# Patient Record
Sex: FEMALE | Race: ASIAN | NOT HISPANIC OR LATINO | URBAN - METROPOLITAN AREA
[De-identification: names, ages, dates, MRNs, and addresses within clinical notes are randomized per-mention and may not be internally consistent; named-entity substitution may affect disease eponyms.]

---

## 2024-10-15 ENCOUNTER — LAB VISIT (OUTPATIENT)
Dept: LAB | Facility: HOSPITAL | Age: 34
End: 2024-10-15
Attending: INTERNAL MEDICINE
Payer: COMMERCIAL

## 2024-10-15 ENCOUNTER — OFFICE VISIT (OUTPATIENT)
Dept: FAMILY MEDICINE | Facility: CLINIC | Age: 34
End: 2024-10-15
Payer: COMMERCIAL

## 2024-10-15 VITALS
OXYGEN SATURATION: 98 % | BODY MASS INDEX: 39.63 KG/M2 | TEMPERATURE: 98 F | DIASTOLIC BLOOD PRESSURE: 62 MMHG | WEIGHT: 232.13 LBS | HEIGHT: 64 IN | SYSTOLIC BLOOD PRESSURE: 116 MMHG | HEART RATE: 86 BPM

## 2024-10-15 DIAGNOSIS — Z00.00 HEALTHCARE MAINTENANCE: ICD-10-CM

## 2024-10-15 DIAGNOSIS — E89.0 POSTSURGICAL HYPOTHYROIDISM: ICD-10-CM

## 2024-10-15 DIAGNOSIS — Z00.00 HEALTHCARE MAINTENANCE: Primary | ICD-10-CM

## 2024-10-15 LAB
ALBUMIN SERPL BCP-MCNC: 4.1 G/DL (ref 3.5–5.2)
ALP SERPL-CCNC: 54 U/L (ref 55–135)
ALT SERPL W/O P-5'-P-CCNC: 11 U/L (ref 10–44)
ANION GAP SERPL CALC-SCNC: 9 MMOL/L (ref 8–16)
AST SERPL-CCNC: 14 U/L (ref 10–40)
BASOPHILS # BLD AUTO: 0.11 K/UL (ref 0–0.2)
BASOPHILS NFR BLD: 1.3 % (ref 0–1.9)
BILIRUB SERPL-MCNC: 0.6 MG/DL (ref 0.1–1)
BUN SERPL-MCNC: 8 MG/DL (ref 6–20)
CALCIUM SERPL-MCNC: 9.3 MG/DL (ref 8.7–10.5)
CHLORIDE SERPL-SCNC: 105 MMOL/L (ref 95–110)
CHOLEST SERPL-MCNC: 188 MG/DL (ref 120–199)
CHOLEST/HDLC SERPL: 3.4 {RATIO} (ref 2–5)
CO2 SERPL-SCNC: 27 MMOL/L (ref 23–29)
CREAT SERPL-MCNC: 0.8 MG/DL (ref 0.5–1.4)
DIFFERENTIAL METHOD BLD: ABNORMAL
EOSINOPHIL # BLD AUTO: 0.2 K/UL (ref 0–0.5)
EOSINOPHIL NFR BLD: 1.7 % (ref 0–8)
ERYTHROCYTE [DISTWIDTH] IN BLOOD BY AUTOMATED COUNT: 12.3 % (ref 11.5–14.5)
EST. GFR  (NO RACE VARIABLE): >60 ML/MIN/1.73 M^2
GLUCOSE SERPL-MCNC: 86 MG/DL (ref 70–110)
HCT VFR BLD AUTO: 44.1 % (ref 37–48.5)
HDLC SERPL-MCNC: 56 MG/DL (ref 40–75)
HDLC SERPL: 29.8 % (ref 20–50)
HGB BLD-MCNC: 13.9 G/DL (ref 12–16)
IMM GRANULOCYTES # BLD AUTO: 0.03 K/UL (ref 0–0.04)
IMM GRANULOCYTES NFR BLD AUTO: 0.3 % (ref 0–0.5)
LDLC SERPL CALC-MCNC: 113.2 MG/DL (ref 63–159)
LYMPHOCYTES # BLD AUTO: 1.7 K/UL (ref 1–4.8)
LYMPHOCYTES NFR BLD: 20.2 % (ref 18–48)
MCH RBC QN AUTO: 28.4 PG (ref 27–31)
MCHC RBC AUTO-ENTMCNC: 31.5 G/DL (ref 32–36)
MCV RBC AUTO: 90 FL (ref 82–98)
MONOCYTES # BLD AUTO: 0.6 K/UL (ref 0.3–1)
MONOCYTES NFR BLD: 7 % (ref 4–15)
NEUTROPHILS # BLD AUTO: 6 K/UL (ref 1.8–7.7)
NEUTROPHILS NFR BLD: 69.5 % (ref 38–73)
NONHDLC SERPL-MCNC: 132 MG/DL
NRBC BLD-RTO: 0 /100 WBC
PLATELET # BLD AUTO: 347 K/UL (ref 150–450)
PMV BLD AUTO: 9.6 FL (ref 9.2–12.9)
POTASSIUM SERPL-SCNC: 4.1 MMOL/L (ref 3.5–5.1)
PROT SERPL-MCNC: 7.4 G/DL (ref 6–8.4)
RBC # BLD AUTO: 4.89 M/UL (ref 4–5.4)
SODIUM SERPL-SCNC: 141 MMOL/L (ref 136–145)
T4 FREE SERPL-MCNC: 1 NG/DL (ref 0.71–1.51)
TRIGL SERPL-MCNC: 94 MG/DL (ref 30–150)
TSH SERPL DL<=0.005 MIU/L-ACNC: 4.47 UIU/ML (ref 0.4–4)
WBC # BLD AUTO: 8.6 K/UL (ref 3.9–12.7)

## 2024-10-15 PROCEDURE — 3008F BODY MASS INDEX DOCD: CPT | Mod: CPTII,S$GLB,, | Performed by: INTERNAL MEDICINE

## 2024-10-15 PROCEDURE — 3074F SYST BP LT 130 MM HG: CPT | Mod: CPTII,S$GLB,, | Performed by: INTERNAL MEDICINE

## 2024-10-15 PROCEDURE — 3078F DIAST BP <80 MM HG: CPT | Mod: CPTII,S$GLB,, | Performed by: INTERNAL MEDICINE

## 2024-10-15 PROCEDURE — 85025 COMPLETE CBC W/AUTO DIFF WBC: CPT | Performed by: INTERNAL MEDICINE

## 2024-10-15 PROCEDURE — 84439 ASSAY OF FREE THYROXINE: CPT | Performed by: INTERNAL MEDICINE

## 2024-10-15 PROCEDURE — 36415 COLL VENOUS BLD VENIPUNCTURE: CPT | Mod: PN | Performed by: INTERNAL MEDICINE

## 2024-10-15 PROCEDURE — 1159F MED LIST DOCD IN RCRD: CPT | Mod: CPTII,S$GLB,, | Performed by: INTERNAL MEDICINE

## 2024-10-15 PROCEDURE — 80061 LIPID PANEL: CPT | Performed by: INTERNAL MEDICINE

## 2024-10-15 PROCEDURE — 99204 OFFICE O/P NEW MOD 45 MIN: CPT | Mod: S$GLB,,, | Performed by: INTERNAL MEDICINE

## 2024-10-15 PROCEDURE — 1160F RVW MEDS BY RX/DR IN RCRD: CPT | Mod: CPTII,S$GLB,, | Performed by: INTERNAL MEDICINE

## 2024-10-15 PROCEDURE — 86803 HEPATITIS C AB TEST: CPT | Performed by: INTERNAL MEDICINE

## 2024-10-15 PROCEDURE — 99999 PR PBB SHADOW E&M-EST. PATIENT-LVL IV: CPT | Mod: PBBFAC,,, | Performed by: INTERNAL MEDICINE

## 2024-10-15 PROCEDURE — 84443 ASSAY THYROID STIM HORMONE: CPT | Performed by: INTERNAL MEDICINE

## 2024-10-15 PROCEDURE — 83036 HEMOGLOBIN GLYCOSYLATED A1C: CPT | Performed by: INTERNAL MEDICINE

## 2024-10-15 PROCEDURE — 87389 HIV-1 AG W/HIV-1&-2 AB AG IA: CPT | Performed by: INTERNAL MEDICINE

## 2024-10-15 PROCEDURE — 80053 COMPREHEN METABOLIC PANEL: CPT | Performed by: INTERNAL MEDICINE

## 2024-10-15 RX ORDER — LEVOTHYROXINE SODIUM 100 UG/1
100 TABLET ORAL
Qty: 30 TABLET | Refills: 11 | Status: SHIPPED | OUTPATIENT
Start: 2024-10-15 | End: 2025-10-15

## 2024-10-15 RX ORDER — LEVOTHYROXINE SODIUM 100 UG/1
10 CAPSULE ORAL DAILY
COMMUNITY
Start: 2024-04-08 | End: 2024-10-15

## 2024-10-15 NOTE — PROGRESS NOTES
HISTORY OF PRESENT ILLNESS:  Dennise Walters is a 33 y.o. female who presents to the clinic today for Establish Care and Medication Refill  .     History of Present Illness    Dennise presents today for medication refill and to establish care.    She underwent a total thyroidectomy with neck dissection in 2024 for thyroid nodules. Histopathology revealed thyroid follicular nodular disease, multinodular colloid goiter with chronic lymphocytic thyroiditis affecting the right lobe, left lobe, and isthmus. Prior to surgery, she experienced high heart rate, palpitations, and shortness of breath when talking and climbing stairs quickly. She denies any current symptoms of chest pain, palpitations, or lightheadedness since the thyroidectomy and initiation of thyroid hormone replacement therapy. Her TSH level from May 13, 2024, was 2.57, within normal range. She is currently taking Levothyroxine (Euthyrox) 100 mcg daily for hypothyroidism management.    She underwent ambulatory and Holter monitoring prior to her thyroid diagnosis due to experiencing a high heart rate. The interpreting cardiologist inquired about any health concerns, to which she responded negatively. No further cardiac evaluation was pursued at that time.    She reports a history of Polycystic Ovary Syndrome (PCOS), diagnosed in . She denies any follow-up care for this condition since the initial diagnosis.    She reports maternal history of diabetes and hypertension. Maternal grandmother had diabetes. Maternal grandfather had alcohol dependence and liver cancer. Paternal grandmother has hypertension and was recently diagnosed with a bone disease following a fall and surgery, with a biopsy suggesting possible malignancy. Paternal grandfather  of lung cancer; she denies he was a smoker but reports possible fertilizer exposure in his youth. Maternal uncle had alcohol dependence. Maternal aunt had liver cancer. She denies  family history of heart attack or stroke.    She reports allergies to crab and shrimp. She denies any medication allergies.    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 She lives alone. She reports occasional alcohol use, typically one drink per month. She denies any smoking history.    Total thyroidectomy on March 25, 2024, performed by Dr. Pro Burrell in [facility].    She reports receiving 5 COVID-19 shots, with the most recent one administered in 2023. She completed the HPV vaccine series in 2024. She received a pneumococcal vaccine in 2022. She recalls last receiving a tetanus booster during college and is willing to receive an update.      ROS:  General: -fever, -chills, -fatigue, -weight gain, -weight loss  Eyes: -vision changes, -redness, -discharge  ENT: -ear pain, -nasal congestion, -sore throat  Cardiovascular: -chest pain, +palpitations, -lower extremity edema  Respiratory: -cough, +shortness of breath  Gastrointestinal: -abdominal pain, -nausea, -vomiting, -diarrhea, -constipation, -blood in stool  Genitourinary: -dysuria, -hematuria, -frequency  Musculoskeletal: -joint pain, -muscle pain  Skin: -rash, -lesion  Neurological: -headache, -dizziness, -numbness, -tingling  Psychiatric: -anxiety, -depression, -sleep difficulty  Allergic: +allergic reactions             PAST MEDICAL HISTORY:  Past Medical History:   Diagnosis Date    Hypothyroidism     PCOS (polycystic ovarian syndrome)        PAST SURGICAL HISTORY:  Past Surgical History:   Procedure Laterality Date    THYROIDECTOMY, TOTAL, WITH RADICAL NECK DISSECTION Bilateral        SOCIAL HISTORY:  Social History     Socioeconomic History    Marital status: Single   Occupational History    Occupation: nurse [employer]   Tobacco Use    Smoking status: Never    Smokeless tobacco: Never   Substance and Sexual Activity    Alcohol use: Not Currently     Comment: one beer per month    Drug use: Never    Sexual activity: Never      Social Drivers of Health     Financial Resource Strain: Low Risk  (9/9/2024)    Overall Financial Resource Strain (CARDIA)     Difficulty of Paying Living Expenses: Not hard at all   Food Insecurity: No Food Insecurity (9/9/2024)    Hunger Vital Sign     Worried About Running Out of Food in the Last Year: Never true     Ran Out of Food in the Last Year: Never true   Physical Activity: Insufficiently Active (9/9/2024)    Exercise Vital Sign     Days of Exercise per Week: 1 day     Minutes of Exercise per Session: 50 min   Stress: No Stress Concern Present (9/9/2024)    [facility] [place] [place] [employer] - Occupational Stress Questionnaire     Feeling of Stress : Not at all   Housing Stability: Unknown (9/9/2024)    Housing Stability Vital Sign     Unable to Pay for Housing in the Last Year: No       FAMILY HISTORY:  Family History   Problem Relation Name Age of Onset    Diabetes Mother Magda Walters     Hypertension Mother Magda Walters     No Known Problems Father      No Known Problems Sister      Diabetes Maternal Grandmother Funmilayo Bowling     Alcohol abuse Maternal Grandfather Ab Bowling     Liver disease Maternal Grandfather Ab Bowling     Hypertension Paternal Grandmother Eileen Estrellado     Cancer Paternal Grandmother Eileen Estrellado     Lung cancer Paternal Grandfather Martin Estrdomenic     Alcohol abuse Maternal Uncle Ab Bowling Jr        ALLERGIES AND MEDICATIONS: updated and reviewed.  Review of patient's allergies indicates:   Allergen Reactions    Crab Hives    Shrimp Hives     Medication List with Changes/Refills   New Medications    LEVOTHYROXINE (SYNTHROID) 100 MCG TABLET    Take 1 tablet (100 mcg total) by mouth before breakfast.   Discontinued Medications    LEVOTHYROXINE (TIROSINT) 100 MCG CAP    Take 10 mcg by mouth once daily.          CARE TEAM:  Patient Care Team:  Carlos Dallas MD as PCP - General (Internal Medicine)         PHYSICAL EXAM:  "  Vitals:    10/15/24 0908   BP: 116/62   Pulse: 86   Temp: 98.4 °F (36.9 °C)     Weight: 105.3 kg (232 lb 2.3 oz)   Height: 5' 4" (162.6 cm)   Body mass index is 39.85 kg/m².    Physical Exam    Vitals: Blood pressure: 116/62. Pulse: 86.  General: No acute distress. Well-developed. Well-nourished.  Eyes: EOMI. Sclerae anicteric.  HENT: Normocephalic. Atraumatic. Nares patent. Moist oral mucosa.  Ears: Bilateral TMs clear. Bilateral EACs clear.  Cardiovascular: Regular rate. Regular rhythm. No murmurs. No rubs. No gallops. Normal S1, S2.  Respiratory: Normal respiratory effort. Clear to auscultation bilaterally. No rales. No rhonchi. No wheezing.  Abdomen: Soft. Non-tender. Non-distended. Normoactive bowel sounds.  Musculoskeletal: No  obvious deformity.  Extremities: No lower extremity edema.  Neurological: Alert & oriented x3. No slurred speech. Normal gait.  Psychiatric: Normal mood. Normal affect. Good insight. Good judgment.  Skin: Warm. Dry. No rash.  Neck: Post-surgical scar on the neck.             ASSESSMENT AND PLAN:  Assessment & Plan    Reviewed patient's history of total thyroidectomy and neck dissection performed in March 2024 due to thyroid nodules  Assessed histopathology report indicating thyroid follicular nodular disease and multinodular colloid goiter with chronic lymphocytic thyroiditis  Evaluated most recent TSH level from May 13, 2024, which was within normal range at 2.57 on current levothyroxine dose  Considered patient's history of elevated heart rate prior to thyroid surgery, now resolved  Will obtain baseline EKG due to previous cardiac symptoms, despite current normal vital signs  Determined need for cervical cancer screening update    Postsurgical hypothyroidism  -     levothyroxine (SYNTHROID) 100 MCG tablet; Take 1 tablet (100 mcg total) by mouth before breakfast.  Dispense: 30 tablet; Refill: 11  -     EKG 12-lead  - Continued Euthyrox 100 mcg daily.    Healthcare maintenance  -     " Hemoglobin A1C; Future; Expected date: 10/15/2024  -     Comprehensive Metabolic Panel; Future; Expected date: 10/15/2024  -     Lipid Panel; Future; Expected date: 10/15/2024  -     CBC Auto Differential; Future; Expected date: 10/15/2024  -     TSH; Future; Expected date: 10/15/2024  -     HIV 1/2 Ag/Ab (4th Gen); Future; Expected date: 10/15/2024  -     Hepatitis C Antibody; Future; Expected date: 10/15/2024  -     Ambulatory referral/consult to Obstetrics / Gynecology; Future; Expected date: 10/22/2024  -     EKG 12-lead  - Explained availability of updated COVID-19 booster for 1879-6308 season.  - Administered COVID-19 booster vaccine for 7523-1476 season.  - Discussed importance of cervical cancer screening despite HPV vaccination.  - Referred to gynecologist for cervical cancer screening and PCOS follow-up.  - Ordered fasting labs.  - Ordered EKG for baseline documentation.  - Follow up in 6 months.  - If no issues arise, transition to annual visits thereafter.         This note was generated with the assistance of ambient listening technology. Verbal consent was obtained by the patient and accompanying visitor(s) for the recording of patient appointment to facilitate this note. I attest to having reviewed and edited the generated note for accuracy, though some syntax or spelling errors may persist. Please contact the author of this note for any clarification.        This is my first encounter with patient.    Surgeon in [facility]: Dr. Pro Burrell.  Thyroidectomy 3/25/2024.    Answers submitted by the patient for this visit:  Review of Systems Questionnaire (Submitted on 10/10/2024)  activity change: No  unexpected weight change: No  neck pain: No  hearing loss: No  rhinorrhea: No  trouble swallowing: No  eye discharge: No  visual disturbance: No  chest tightness: No  wheezing: No  chest pain: No  palpitations: No  blood in stool: No  constipation: No  vomiting: No  diarrhea: No  polydipsia:  No  polyuria: No  difficulty urinating: No  hematuria: No  menstrual problem: Yes  dysuria: No  joint swelling: No  arthralgias: No  headaches: No  weakness: No  confusion: No  dysphoric mood: No

## 2024-10-15 NOTE — PATIENT INSTRUCTIONS
Consider getting an updated COVID vaccine.    It is recommended for everyone ages 6 months and older to receive an updated 3936-0719 COVID-19 vaccine to protect against the potentially serious outcomes of COVID-19 this fall and winter whether or not they have ever previously been vaccinated with a COVID-19 vaccine.  The virus that causes COVID-19 is always changing and protection from COVID-19 vaccines declines over time. Receiving an updated 3744-7937 COVID-19 vaccine can restore and enhance protection against the virus variants currently responsible for most infections and hospitalizations in the [place]. COVID-19 vaccination also reduces the chance of suffering the effects of Long COVID, which can develop during or following acute infection and last for an extended duration.

## 2024-10-16 LAB
ESTIMATED AVG GLUCOSE: 100 MG/DL (ref 68–131)
HBA1C MFR BLD: 5.1 % (ref 4–5.6)
HCV AB SERPL QL IA: NORMAL
HIV 1+2 AB+HIV1 P24 AG SERPL QL IA: NORMAL

## 2024-10-21 ENCOUNTER — OFFICE VISIT (OUTPATIENT)
Dept: OBSTETRICS AND GYNECOLOGY | Facility: CLINIC | Age: 34
End: 2024-10-21
Payer: COMMERCIAL

## 2024-10-21 VITALS
BODY MASS INDEX: 39.6 KG/M2 | DIASTOLIC BLOOD PRESSURE: 70 MMHG | WEIGHT: 231.94 LBS | HEIGHT: 64 IN | SYSTOLIC BLOOD PRESSURE: 116 MMHG

## 2024-10-21 DIAGNOSIS — Z00.00 HEALTHCARE MAINTENANCE: ICD-10-CM

## 2024-10-21 DIAGNOSIS — N93.9 ABNORMAL UTERINE BLEEDING (AUB): ICD-10-CM

## 2024-10-21 DIAGNOSIS — Z01.419 WELL WOMAN EXAM WITH ROUTINE GYNECOLOGICAL EXAM: Primary | ICD-10-CM

## 2024-10-21 PROCEDURE — 87624 HPV HI-RISK TYP POOLED RSLT: CPT | Performed by: STUDENT IN AN ORGANIZED HEALTH CARE EDUCATION/TRAINING PROGRAM

## 2024-10-21 PROCEDURE — 3008F BODY MASS INDEX DOCD: CPT | Mod: CPTII,S$GLB,, | Performed by: STUDENT IN AN ORGANIZED HEALTH CARE EDUCATION/TRAINING PROGRAM

## 2024-10-21 PROCEDURE — 99385 PREV VISIT NEW AGE 18-39: CPT | Mod: S$GLB,,, | Performed by: STUDENT IN AN ORGANIZED HEALTH CARE EDUCATION/TRAINING PROGRAM

## 2024-10-21 PROCEDURE — 1159F MED LIST DOCD IN RCRD: CPT | Mod: CPTII,S$GLB,, | Performed by: STUDENT IN AN ORGANIZED HEALTH CARE EDUCATION/TRAINING PROGRAM

## 2024-10-21 PROCEDURE — 1160F RVW MEDS BY RX/DR IN RCRD: CPT | Mod: CPTII,S$GLB,, | Performed by: STUDENT IN AN ORGANIZED HEALTH CARE EDUCATION/TRAINING PROGRAM

## 2024-10-21 PROCEDURE — 3074F SYST BP LT 130 MM HG: CPT | Mod: CPTII,S$GLB,, | Performed by: STUDENT IN AN ORGANIZED HEALTH CARE EDUCATION/TRAINING PROGRAM

## 2024-10-21 PROCEDURE — 3078F DIAST BP <80 MM HG: CPT | Mod: CPTII,S$GLB,, | Performed by: STUDENT IN AN ORGANIZED HEALTH CARE EDUCATION/TRAINING PROGRAM

## 2024-10-21 PROCEDURE — 3044F HG A1C LEVEL LT 7.0%: CPT | Mod: CPTII,S$GLB,, | Performed by: STUDENT IN AN ORGANIZED HEALTH CARE EDUCATION/TRAINING PROGRAM

## 2024-10-21 PROCEDURE — 99999 PR PBB SHADOW E&M-EST. PATIENT-LVL III: CPT | Mod: PBBFAC,,, | Performed by: STUDENT IN AN ORGANIZED HEALTH CARE EDUCATION/TRAINING PROGRAM

## 2024-10-21 PROCEDURE — 88175 CYTOPATH C/V AUTO FLUID REDO: CPT | Performed by: STUDENT IN AN ORGANIZED HEALTH CARE EDUCATION/TRAINING PROGRAM

## 2024-10-21 NOTE — PROGRESS NOTES
Subjective     Patient ID: Dennise Walters is a 33 y.o. female.    Chief Complaint:  Well Woman (Irregular Period./)      History of Present Illness  32 yo G0 presents for WWE and discuss AUB    History of PCOS, was on metformin/hormonal medication previously  Periods occur roughly monthly and last 7 days, bleeding is light  In the past has missed 2-3 months of cycles in a row but not within the last year     Just moved from [facility] a few months ago. Has never been sexually active or had a pap screening     GYN & OB History  No LMP recorded (lmp unknown).   Date of Last Pap: No result found    OB History    Para Term  AB Living   0 0 0 0 0 0   SAB IAB Ectopic Multiple Live Births   0 0 0 0 0       Review of Systems  Review of Systems   All other systems reviewed and are negative.         Objective   Physical Exam:   Constitutional: She appears well-developed and well-nourished.    HENT:   Head: Normocephalic and atraumatic.    Eyes: EOM are normal.      Pulmonary/Chest: Effort normal.        Abdominal: Soft.     Genitourinary:    Vagina, right adnexa and left adnexa normal.      Pelvic exam was performed with patient in the lithotomy position.   The external female genitalia was normal.   Genitalia hair distrobution normal .   There is no lesion on the right labia. There is no lesion on the left labia.    pap smear completed   Genitourinary Comments: Female chaperone present for duration of exam    Cervix not fully visualized due to discomfort with speculum exam             Musculoskeletal: Normal range of motion.       Neurological: She is alert.    Skin: Skin is warm and dry.    Psychiatric: She has a normal mood and affect.            Assessment and Plan     1. Well woman exam with routine gynecological exam    2. Healthcare maintenance    3. Abnormal uterine bleeding (AUB)        Plan:  1. Well woman exam with routine gynecological exam (Primary)  - Pap done today.  -  Screening tests as ordered.  - Diet and exercise encouraged.  Reviewed ASCCP Pap guidelines and screening recommendations.    Counseling: Exercise  Health Screens: Mammography  Colonoscopy:discussed  Health Maintenance reviewed  - Liquid-Based Pap Smear, Screening  - Pt cycles wnl, advised to return if she misses periods 2-3 months in a row    2. Healthcare maintenance  - Ambulatory referral/consult to Obstetrics / Gynecology    Follow up in 1 year for WWE or sooner as needed     Abraham Lindsay III, MD  [facility]   [address] [place] [address] [phone]

## 2024-10-23 ENCOUNTER — TELEPHONE (OUTPATIENT)
Dept: FAMILY MEDICINE | Facility: CLINIC | Age: 34
End: 2024-10-23
Payer: COMMERCIAL

## 2024-10-23 DIAGNOSIS — E89.0 POSTSURGICAL HYPOTHYROIDISM: Primary | ICD-10-CM

## 2024-10-23 LAB
HPV HR 12 DNA SPEC QL NAA+PROBE: NEGATIVE
HPV16 AG SPEC QL: NEGATIVE
HPV18 DNA SPEC QL NAA+PROBE: NEGATIVE

## 2024-10-23 NOTE — TELEPHONE ENCOUNTER
----- Message from Carlos Dallas MD sent at 10/23/2024  2:12 PM CDT -----  Call to schedule non fasting lab Jan 2025.

## 2024-10-25 LAB
FINAL PATHOLOGIC DIAGNOSIS: NORMAL
Lab: NORMAL

## 2025-01-06 ENCOUNTER — LAB VISIT (OUTPATIENT)
Dept: LAB | Facility: HOSPITAL | Age: 35
End: 2025-01-06
Attending: INTERNAL MEDICINE
Payer: COMMERCIAL

## 2025-01-06 DIAGNOSIS — E89.0 POSTSURGICAL HYPOTHYROIDISM: ICD-10-CM

## 2025-01-06 LAB
T4 FREE SERPL-MCNC: 1.08 NG/DL (ref 0.71–1.51)
TSH SERPL DL<=0.005 MIU/L-ACNC: 11.01 UIU/ML (ref 0.4–4)

## 2025-01-06 PROCEDURE — 36415 COLL VENOUS BLD VENIPUNCTURE: CPT | Performed by: INTERNAL MEDICINE

## 2025-01-06 PROCEDURE — 84439 ASSAY OF FREE THYROXINE: CPT | Performed by: INTERNAL MEDICINE

## 2025-01-06 PROCEDURE — 84443 ASSAY THYROID STIM HORMONE: CPT | Performed by: INTERNAL MEDICINE

## 2025-01-08 ENCOUNTER — TELEPHONE (OUTPATIENT)
Dept: FAMILY MEDICINE | Facility: CLINIC | Age: 35
End: 2025-01-08
Payer: COMMERCIAL

## 2025-01-08 DIAGNOSIS — E89.0 POSTSURGICAL HYPOTHYROIDISM: Primary | ICD-10-CM

## 2025-01-08 RX ORDER — LEVOTHYROXINE SODIUM 112 UG/1
112 TABLET ORAL
Qty: 30 TABLET | Refills: 11 | Status: SHIPPED | OUTPATIENT
Start: 2025-01-08 | End: 2026-01-08

## 2025-01-08 NOTE — TELEPHONE ENCOUNTER
----- Message from Carlos Dallas MD sent at 1/8/2025 12:53 PM CST -----  Please call to schedule non fasting thyroid lab in 8 weeks.

## 2025-01-24 ENCOUNTER — OFFICE VISIT (OUTPATIENT)
Dept: ENDOCRINOLOGY | Facility: CLINIC | Age: 35
End: 2025-01-24
Payer: COMMERCIAL

## 2025-01-24 VITALS
WEIGHT: 229.38 LBS | HEART RATE: 112 BPM | DIASTOLIC BLOOD PRESSURE: 86 MMHG | SYSTOLIC BLOOD PRESSURE: 106 MMHG | BODY MASS INDEX: 39.38 KG/M2

## 2025-01-24 DIAGNOSIS — E66.812 CLASS 2 OBESITY WITHOUT SERIOUS COMORBIDITY WITH BODY MASS INDEX (BMI) OF 39.0 TO 39.9 IN ADULT, UNSPECIFIED OBESITY TYPE: ICD-10-CM

## 2025-01-24 DIAGNOSIS — E89.0 POSTSURGICAL HYPOTHYROIDISM: Primary | ICD-10-CM

## 2025-01-24 PROCEDURE — 99999 PR PBB SHADOW E&M-EST. PATIENT-LVL III: CPT | Mod: PBBFAC,,, | Performed by: HOSPITALIST

## 2025-01-24 PROCEDURE — 1160F RVW MEDS BY RX/DR IN RCRD: CPT | Mod: CPTII,S$GLB,, | Performed by: HOSPITALIST

## 2025-01-24 PROCEDURE — 3079F DIAST BP 80-89 MM HG: CPT | Mod: CPTII,S$GLB,, | Performed by: HOSPITALIST

## 2025-01-24 PROCEDURE — 3008F BODY MASS INDEX DOCD: CPT | Mod: CPTII,S$GLB,, | Performed by: HOSPITALIST

## 2025-01-24 PROCEDURE — 3074F SYST BP LT 130 MM HG: CPT | Mod: CPTII,S$GLB,, | Performed by: HOSPITALIST

## 2025-01-24 PROCEDURE — 99204 OFFICE O/P NEW MOD 45 MIN: CPT | Mod: S$GLB,,, | Performed by: HOSPITALIST

## 2025-01-24 PROCEDURE — 1159F MED LIST DOCD IN RCRD: CPT | Mod: CPTII,S$GLB,, | Performed by: HOSPITALIST

## 2025-01-24 NOTE — ASSESSMENT & PLAN NOTE
- Body mass index is 39.38 kg/m².  - Encourage pt to work on healthy diet, increase exercise as tolerated.  - Continue to monitor weight   - Consider [facility] loss [facility]

## 2025-01-24 NOTE — PROGRESS NOTES
Subjective:      Patient ID: Dennise Walters is a 34 y.o. female presented to [facility] today.  Chief complaint:  Abnormal Lab (tsh)      History of Present illness: Dennise Walters is a 34 y.o. female here for  postsurgical hypothyroidism  Other significant past medical history:  Obesity, PCOS  Current PCP Carlos Dallas MD  Work in the [employer]      Interval history: New to Endocrine.  Patient is a nurse here at our emergency room.  Originally from the [facility].    Patient had a total thyroidectomy due to hyperthyroidism and multiple thyroid nodules in the [facility] on 3/25/2024.  [place] the [place] after that.  Having issue adjusting her levothyroxine dose.    Patient does work shift work, with occasional night shift and day shift  She gain weight after her thyroid surgery, now weight 229 lb       Postsurgical Hypothyroidism  - Diagnosed  in: Total thyroidectomy 3/25/2024 in setting of hyperthyroidism and goiter/multiple thyroid nodules in the [facility].  - Medical record review, will scan to our records.  had hyperthyroidism elevated FT4 and FT3 with low TSH in 2/5/2024, symptoms of tachycardia: was on Carbimazole and propanolol. Had Lugol and SSKI drops before surgery given elevated FT4, FT3 at before total thyroidectomy. She was in [place] go [place] the [place].   - Pathology records review:  Pathology:  Multiple colloid goiter, with chronic lymphocytic thyroiditis  - Family history thyroid disorder: no, not that she know off   - Hx of Thyroid goiter: yes  - Hx of Autoimmune issue: no, ?mom with arthritis?  - Hx of gluten intolerance/lactose intolerance: yes? Lactose intolerance?  - Tobacco use: no  - Plan for pregnancy at this time: no  - Chronic tachycardia noted  - Irregular menstrual cycle> for many yeas, PCOS>> was given metformin     - Current medication: Generic levothyroxine 112 mcg daily, was on Euthyrox>> swap  "to generic  - Takes thyroid medication properly without food first thing in the morning, no    Current symptoms:   No   Yes  []    [x]   Weight gain  []    [x]   Fatigue  [x]    []   Constipation  []    [x]   Hair loss  [x]    []   Brittle nails  [x]    []   Mental fog  []    [x]   Cold intolerance  [x]    []   Recent severe illness  Medications:  [x]    []   Lithium Use  [x]    []   Amiodarone use  [x]    []   NSAIDs/systemic steroid  []    []   Chemotherapy   []    []   Radiation Treatment  []    []   Estrogen therapy  []    []   Supplements:  That includes:  High dose iodine: Including "thyroid support", Kelp, Iodine drops, Sea iqbal, Bladder wrack  [x]    []   Other Supplements: Biotin, Ashwagandha, selenium    Thyroid lab work  Lab Results   Component Value Date    TSH 11.009 (H) 01/06/2025    TSH 4.475 (H) 10/15/2024    FREET4 1.08 01/06/2025    FREET4 1.00 10/15/2024      Antibodies  No results found for: "THYROPEROXID", "THYROIDAB", "TSIMMGLBN", "THYROIDSTIMI", "THYROTROPINR", "THGABSCRN"       The patient's medications, allergies, past medical, surgical, social and family histories were reviewed and updated as appropriate.  Review of Systems: pertinent positives as per the above HPI, and otherwise negative.    Objective:   /86   Pulse (!) 112   Wt 104.1 kg (229 lb 6.4 oz)   BMI 39.38 kg/m²   Body mass index is 39.38 kg/m².  Vital signs reviewed    Physical Exam  Vitals and nursing note reviewed.   Constitutional:       Appearance: Normal appearance. She is well-developed. She is obese. She is not ill-appearing.   Neck:      Thyroid: No thyromegaly.   Pulmonary:      Effort: Pulmonary effort is normal. No respiratory distress.   Musculoskeletal:         General: Normal range of motion.      Cervical back: Normal range of motion.   Neurological:      General: No focal deficit present.      Mental Status: She is alert. Mental status is at baseline.   Psychiatric:         Mood and Affect: Mood normal.   "       Behavior: Behavior normal.       Labs reviewed:  See results in subjective  Lab Results   Component Value Date    HGBA1C 5.1 10/15/2024     Lab Results   Component Value Date    CHOL 188 10/15/2024    HDL 56 10/15/2024    LDLCALC 113.2 10/15/2024    TRIG 94 10/15/2024    CHOLHDL 29.8 10/15/2024     Lab Results   Component Value Date     10/15/2024    K 4.1 10/15/2024     10/15/2024    CO2 27 10/15/2024    GLU 86 10/15/2024    BUN 8 10/15/2024    CREATININE 0.8 10/15/2024    CALCIUM 9.3 10/15/2024    PROT 7.4 10/15/2024    ALBUMIN 4.1 10/15/2024    BILITOT 0.6 10/15/2024    ALKPHOS 54 (L) 10/15/2024    AST 14 10/15/2024    ALT 11 10/15/2024    ANIONGAP 9 10/15/2024    EGFRNORACEVR >60 10/15/2024    TSH 11.009 (H) 01/06/2025     Assessment     1. Postsurgical hypothyroidism  Ambulatory referral/consult to Endocrinology    Thyroid Peroxidase Antibody    T3    THYROGLOBULIN    T4, Free      2. Class 2 obesity without serious comorbidity with body mass index (BMI) of 39.0 to 39.9 in adult, unspecified obesity type           Plan     Postsurgical hypothyroidism  - Hypothyroidism due to postsurgical hypothyroidism, previously had goiter/multiple thyroid nodules and hyperthyroidism  - I reviewed lab work trends: TSH, Free T4, with patient in clinic today 1/24/2025   - Discussed and confirmed the proper way of taking levothyroxine: daily on an empty stomach, waiting 30 minutes before any other medication. The patient verbalized understanding.  - CONTINUE:  Levothyroxine 112 mcg daily.  Check lab work in 6 weeks, pending results will adjust medication more  - Trend lab work TSH, FT4 every 5 months> follow-up with endocrinology to adjust medication  - If TSH/T4 in normal range, patient should continue refills with their PCP Carlos Dallas MD, given chronic nature of hypothyroidism      Class 2 obesity without serious comorbidity with body mass index (BMI) of 39.0 to 39.9 in adult  - Body mass index is 39.38  kg/m².  - Encourage pt to work on healthy diet, increase exercise as tolerated.  - Continue to monitor weight   - Consider [facility]       Advised patient to follow up with PCP for routine health maintenance care.   RTC in five months      Paul Lawrence M.D.  Endocrinology  [facility]  1/24/2025      Disclaimer: This note has been generated in part with the use of voice-recognition software. There may be typographical errors that have been missed during proof-reading.

## 2025-01-24 NOTE — ASSESSMENT & PLAN NOTE
- Hypothyroidism due to postsurgical hypothyroidism, previously had goiter/multiple thyroid nodules and hyperthyroidism  - I reviewed lab work trends: TSH, Free T4, with patient in clinic today 1/24/2025   - Discussed and confirmed the proper way of taking levothyroxine: daily on an empty stomach, waiting 30 minutes before any other medication. The patient verbalized understanding.  - CONTINUE:  Levothyroxine 112 mcg daily.  Check lab work in 6 weeks, pending results will adjust medication more  - Trend lab work TSH, FT4 every 5 months> follow-up with endocrinology to adjust medication  - If TSH/T4 in normal range, patient should continue refills with their PCP Carlos Dallas MD, given chronic nature of hypothyroidism

## 2025-02-14 ENCOUNTER — LAB VISIT (OUTPATIENT)
Dept: LAB | Facility: HOSPITAL | Age: 35
End: 2025-02-14
Attending: INTERNAL MEDICINE
Payer: COMMERCIAL

## 2025-02-14 DIAGNOSIS — E89.0 POSTSURGICAL HYPOTHYROIDISM: ICD-10-CM

## 2025-02-14 LAB
T3 SERPL-MCNC: 67 NG/DL (ref 60–180)
T4 FREE SERPL-MCNC: 1.08 NG/DL (ref 0.71–1.51)
THYROPEROXIDASE IGG SERPL-ACNC: <6 IU/ML
TSH SERPL DL<=0.005 MIU/L-ACNC: 3.14 UIU/ML (ref 0.4–4)

## 2025-02-14 PROCEDURE — 84439 ASSAY OF FREE THYROXINE: CPT | Performed by: INTERNAL MEDICINE

## 2025-02-14 PROCEDURE — 84480 ASSAY TRIIODOTHYRONINE (T3): CPT | Performed by: HOSPITALIST

## 2025-02-14 PROCEDURE — 84443 ASSAY THYROID STIM HORMONE: CPT | Performed by: INTERNAL MEDICINE

## 2025-02-14 PROCEDURE — 86376 MICROSOMAL ANTIBODY EACH: CPT | Performed by: HOSPITALIST

## 2025-02-14 PROCEDURE — 36415 COLL VENOUS BLD VENIPUNCTURE: CPT | Performed by: HOSPITALIST

## 2025-02-14 PROCEDURE — 84432 ASSAY OF THYROGLOBULIN: CPT | Performed by: HOSPITALIST

## 2025-02-17 LAB
THRYOGLOBULIN INTERPRETATION: ABNORMAL
THYROGLOB AB SERPL-ACNC: 5.5 IU/ML
THYROGLOB SERPL-MCNC: <0.1 NG/ML

## 2025-02-18 ENCOUNTER — RESULTS FOLLOW-UP (OUTPATIENT)
Dept: FAMILY MEDICINE | Facility: CLINIC | Age: 35
End: 2025-02-18

## 2025-03-04 ENCOUNTER — RESULTS FOLLOW-UP (OUTPATIENT)
Dept: ENDOCRINOLOGY | Facility: CLINIC | Age: 35
End: 2025-03-04

## 2025-04-15 ENCOUNTER — OFFICE VISIT (OUTPATIENT)
Dept: FAMILY MEDICINE | Facility: CLINIC | Age: 35
End: 2025-04-15
Payer: COMMERCIAL

## 2025-04-15 VITALS
WEIGHT: 240.94 LBS | BODY MASS INDEX: 41.13 KG/M2 | OXYGEN SATURATION: 96 % | DIASTOLIC BLOOD PRESSURE: 82 MMHG | TEMPERATURE: 99 F | HEIGHT: 64 IN | HEART RATE: 88 BPM | SYSTOLIC BLOOD PRESSURE: 108 MMHG

## 2025-04-15 DIAGNOSIS — E66.813 CLASS 3 SEVERE OBESITY DUE TO EXCESS CALORIES WITH SERIOUS COMORBIDITY AND BODY MASS INDEX (BMI) OF 40.0 TO 44.9 IN ADULT: Primary | ICD-10-CM

## 2025-04-15 DIAGNOSIS — E89.0 POSTSURGICAL HYPOTHYROIDISM: ICD-10-CM

## 2025-04-15 DIAGNOSIS — E66.01 CLASS 3 SEVERE OBESITY DUE TO EXCESS CALORIES WITH SERIOUS COMORBIDITY AND BODY MASS INDEX (BMI) OF 40.0 TO 44.9 IN ADULT: Primary | ICD-10-CM

## 2025-04-15 DIAGNOSIS — H04.123 DRY EYES: ICD-10-CM

## 2025-04-15 DIAGNOSIS — Z01.00 ROUTINE EYE EXAM: ICD-10-CM

## 2025-04-15 DIAGNOSIS — R06.83 SNORING: ICD-10-CM

## 2025-04-15 DIAGNOSIS — E28.2 PCOS (POLYCYSTIC OVARIAN SYNDROME): ICD-10-CM

## 2025-04-15 PROCEDURE — 1159F MED LIST DOCD IN RCRD: CPT | Mod: CPTII,S$GLB,, | Performed by: INTERNAL MEDICINE

## 2025-04-15 PROCEDURE — 99214 OFFICE O/P EST MOD 30 MIN: CPT | Mod: S$GLB,,, | Performed by: INTERNAL MEDICINE

## 2025-04-15 PROCEDURE — 1160F RVW MEDS BY RX/DR IN RCRD: CPT | Mod: CPTII,S$GLB,, | Performed by: INTERNAL MEDICINE

## 2025-04-15 PROCEDURE — 3008F BODY MASS INDEX DOCD: CPT | Mod: CPTII,S$GLB,, | Performed by: INTERNAL MEDICINE

## 2025-04-15 PROCEDURE — 3079F DIAST BP 80-89 MM HG: CPT | Mod: CPTII,S$GLB,, | Performed by: INTERNAL MEDICINE

## 2025-04-15 PROCEDURE — 3074F SYST BP LT 130 MM HG: CPT | Mod: CPTII,S$GLB,, | Performed by: INTERNAL MEDICINE

## 2025-04-15 PROCEDURE — 99999 PR PBB SHADOW E&M-EST. PATIENT-LVL V: CPT | Mod: PBBFAC,,, | Performed by: INTERNAL MEDICINE

## 2025-04-15 RX ORDER — METFORMIN HYDROCHLORIDE 500 MG/1
500 TABLET, EXTENDED RELEASE ORAL
Qty: 90 TABLET | Refills: 3 | Status: SHIPPED | OUTPATIENT
Start: 2025-04-15 | End: 2026-04-15

## 2025-04-15 NOTE — PATIENT INSTRUCTIONS
Sample Weight Training Plan ( adapted from [facility]):    1.Goblet Squat  How to:    Stand with feet hip-width apart and hold a weight vertically in front of chest, elbows pointing toward the floor.  Push hips back and bend knees to lower into a squat.  Drive through heels to stand back up to starting position. That's 1 rep. Complete three to five reps with a heavy weight.      2.Bent-Over Row  How to:    With a dumbbell in each hand and feet under hips, hinge at hips with knees slightly bent and arms just in front of legs.  Drive one elbow back toward hips, feeling shoulder blades squeeze together, pulling weight toward side body.  Slowly lower weight back down, then repeat with other arm. That's 1 rep. Complete three to five reps with a medium-heavy weight.        3.Lateral Lunge  How to:    Holding a weight at chest or dumbbells at each side, stand up straight with feet hip-width apart.  Take a large step to the right, sit hips back, and lower down until right knee is nearly parallel with the floor. Your left leg should be straight.  Return to start. That's 1 rep. Complete 10 reps on each side.      4.Chest Press  How to:    Lie flat on back, or on a bench, with feet flat on the ground. With a dumbbell in each hand, extend arms directly over shoulders, palms facing toward feet.  Squeeze shoulder blades together and slowly bend elbows, lowering the weights out to the side, parallel with shoulders, until elbows form 90-degree angles.  Slowly drive the dumbbells back up to start, squeezing shoulder blades the entire time. Thats 1 rep. Complete three to five reps with a medium-heavy weight.      5.Split Stance Shoulder Press    How to:    Grab a pair of dumbbells or a resistance band. Stagger stance into a wide step, one foot forward and one back with hips squared, and hold the weights or band just above shoulders, elbows close to sides.  Leaning forward ever so slightly, bend both knees, and press  through front heel while simultaneously lifting the weights or band to the taqueria, keeping elbows forward and arms in line with your ears.  Lower weights or band back to shoulders. That's 1 rep. Do 10 reps, alternating side for each set.        6.Alternating Reverse Lunge To Bicep Curl  How to:    Grab a pair of dumbbells and hold them at arm's length next to sides, palms facing each other. Stand tall with feet hip-width apart.  Step backward with right leg and lower body until front knee is bent 90 degrees. At the same time as you lunge, curl both dumbbells up to shoulders.  Lower the dumbbells as you return to the starting position. Step back with the other leg and repeat.That's 1 rep. Complete 12 reps with a medium weight.                         Sample Mediterranean Meal Plan    A Mediterranean meal plan for weight loss focuses on nutrient-dense, whole foods that promote satiety while supporting metabolic health. It is rich in fruits, vegetables, whole grains, legumes, lean proteins, and healthy fats, particularly olive oil. The Mediterranean diet is known for its emphasis on heart health, reducing inflammation, and promoting sustainable weight loss. Here's a balanced, 7-day meal plan that follows these principles:    General Guidelines:  Portion Control: Focus on moderate portion sizes, especially for higher-calorie foods like nuts, oils, and grains.  Hydration: Drink plenty of water (aim for 8-10 cups daily) and limit sugary drinks.  Physical Activity: Incorporate regular physical activity, such as walking or light strength training, alongside the diet.  Limit Processed Foods: Avoid refined sugars, processed snacks, and red meat.    Day 1  Breakfast:  Greek yogurt with a handful of mixed berries, 1 tablespoon of marty seeds, and a drizzle of honey.    Lunch:  Grilled chicken salad with mixed greens, tomatoes, cucumbers, red onions, [place] olives, and feta cheese. Drizzle with olive oil and lemon  juice.    Snack:  A small handful of raw almonds.    Dinner:  Baked salmon with a side of roasted vegetables (zucchini, bell peppers, and eggplant), and quinoa.    Day 2  Breakfast:  Whole grain toast or Ronan toast with avocado, a poached egg, and a sprinkle of chili flakes.    Lunch:  Quinoa and chickpea bowl with spinach, cherry tomatoes, cucumber, red bell pepper, and a lemon-olive oil dressing.    Snack:  Sliced cucumber and hummus.    Dinner:  Grilled shrimp with a side of steamed broccoli and a small serving of whole grain couscous.    Day 3  Breakfast:  Oatmeal (prefer old fashioned or steel cut oats) made with unsweetened almond milk, topped with sliced strawberries and a sprinkle of cinnamon.    Lunch:  Mediterranean wrap: Whole wheat tortilla filled with grilled chicken, tzatziki, mixed greens, cucumber, and tomatoes.    Snack:  A small portion of mixed nuts (walnuts, almonds, pistachios).    Dinner:  Lentil soup with a side of mixed greens salad (olive oil and vinegar dressing) and a small piece of whole grain bread.    Day 4  Breakfast:  Scrambled eggs with spinach, tomatoes, and a small amount of feta cheese.    Lunch:  Grilled vegetable salad with roasted eggplant, zucchini, bell peppers, tomatoes, and a drizzle of olive oil, topped with a few slices of grilled chicken breast.    Snack:  Apple slices with almond butter.    Dinner:  Baked cod with garlic and lemon, served with a side of sautéed green beans and brown rice.    Day 5  Breakfast:  Smoothie made with spinach, banana, unsweetened almond milk, marty seeds, and a handful of frozen berries.    Lunch:  Tuna salad (canned tuna in olive oil, mixed greens, tomatoes, red onion, and olives) dressed with lemon juice and olive oil.    Snack:  Carrot sticks with hummus.    Dinner:  Stuffed bell peppers with ground turkey, quinoa, tomatoes, and spices, served with a side of steamed asparagus.    Day 6  Breakfast:  Whole grain toast or Ronan toast with  ricotta cheese, sliced figs, and a drizzle of honey.    Lunch:  Grilled chicken Caesar salad (use a light dressing made with Greek yogurt), served with a side of roasted sweet potatoes.    Snack:  A handful of fresh mixed berries.    Dinner:  Grilled lamb chops with a side of roasted [place] sprouts and a quinoa salad with tomatoes, cucumbers, and olive oil dressing.    Day 7  Breakfast:  Osmar pudding made with unsweetened almond milk, topped with fresh berries and a sprinkle of flaxseed.    Lunch:  Mediterranean bowl: Brown rice, grilled shrimp, cucumber, tomatoes, olives, red onion, and a drizzle of olive oil and lemon.    Snack:  A small portion of Greek yogurt with a teaspoon of honey.    Dinner:  Grilled chicken with a side of roasted cauliflower and a small serving of whole grain couscous.    Key Nutritional Points for Weight Loss:  Lean Proteins: Chicken, turkey, fish (especially fatty fish like salmon), and legumes (like lentils and chickpeas) help build muscle, promote satiety, and support metabolism.  Healthy Fats: Olive oil, nuts, and seeds are rich in heart-healthy fats that also help keep you full longer.  Fiber-Rich Vegetables & Fruits: These help with digestion, reduce hunger, and provide essential vitamins and minerals while being low in calories.  Whole Grains: Brown rice, quinoa, and whole wheat bread provide fiber and energy to fuel the body without causing blood sugar spikes.    This meal plan emphasizes nutrient-dense foods, portion control, and balanced meals, which are the core principles for sustainable weight loss on the Mediterranean diet.

## 2025-04-15 NOTE — PROGRESS NOTES
HISTORY OF PRESENT ILLNESS:  Dennise Walters is a 34 y.o. female who presents to the clinic today for Follow-up, Dry Eye (X 2-3 months), and Weight Loss (Patient would like to discuss losing weight)  .     Last seen by me 10/2024.  Well woman done 10/2024.    She underwent a total thyroidectomy with neck dissection in March 2024 for thyroid nodules. Histopathology revealed thyroid follicular nodular disease, multinodular colloid goiter with chronic lymphocytic thyroiditis affecting the right lobe, left lobe, and isthmus. She is currently taking Levothyroxine 112 mcg daily for hypothyroidism management. Seen by endo 1/2025.    Dennise presents today for follow-up with concerns about dry eyes and weight gain.    She reports eye dryness during the tail end of work shifts. She alternates between contact lenses and glasses, preferring contacts during work. She currently uses two-month contact lenses obtained from the [facility]. She has not had a recent eye exam. She denies eye drops use or eye pain.    She is currently at her highest adult weight of 240 lbs 15.4 ounces. She reports previous weight loss attempts with intermittent fasting. She denies history of weight loss surgeries. Her only weight loss medication has been metformin for PCOS management.    Her physical activity is limited to walking at work, reporting 14,000 steps daily. Her diet consists of rice, eggs, and brian for breakfast; milk, bread, rice, and meat (sometimes with vegetables) for lunch; and evening snacks of guacamole and chips before sleep. Throughout the day, she snacks on peanut butter M&Ms, chips, and cookies. She drinks Sprite when craving something sweet and cold, and does not drink water regularly.    She has a history of thyroidectomy in March of last year, PCOS, and previous tachycardia attributed to thyroid condition.    She gets 6-7 hours of sleep when working and denies diagnosed sleep apnea. Friends have  "reported snoring, with one instance occurring within minutes of falling asleep during a recent trip. She notes this is a new development, which she attributes to recent weight gain.    She takes levothyroxine 112 mcg daily, using a pill organizer to track medication adherence.    Wt Readings from Last 3 Encounters:   04/15/25 0928 109.3 kg (240 lb 15.4 oz)   01/24/25 1442 104.1 kg (229 lb 6.4 oz)   10/21/24 0940 105.2 kg (231 lb 14.8 oz)        Estimated body mass index is 41.36 kg/m² as calculated from the following:    Height as of this encounter: 5' 4" (1.626 m).    Weight as of this encounter: 109.3 kg (240 lb 15.4 oz).  Ideal body weight: 54.7 kg (120 lb 9.5 oz)       Co-morbidities associated with obesity   PCOS       Lowest adult weight: 98 kg  Highest adult weight: 109.3 kg    History of Weight Loss Efforts  Surgery or Procedures: no  Medications: no      Kidney stones: no  Glaucoma: no  H/o eating disorder: no  Heart disease or arrhythmia: no  Anxiety or depression:  no  Personal or family h/o MEN2 or thyroid cancer: no  H/o pancreatitis: no  Current or past use of narcotics: no    Current Physical Activity  Recently starting walking     Current Eating Habits  Breakfast - rice, eggs, and brian; coffee instant no sugar, 3 creamers  Lunch - rice, meat, veggies; or pasta/pesto/tuna  Dinner - guacamole/chips  Snacks - PB M&Ms sharing size, chips, cookies     Beverages - coke/sprite - bottle    Weekend eating routine - cooks with friends sometimes - large meal    Number of dining out occasions per week - once per month    Who does the cooking in the Household? self     Alcohol: one drink per week     Tobacco: no     Sleep: 6-7 hrs    ANGÉLICA: not dx'd     Willingness to change (1-10): 7      She consumes one alcoholic beverage per week, denies smoking, and is not sexually active.    Weight History  Wt Readings from Last 3 Encounters:   04/15/25 0928 109.3 kg (240 lb 15.4 oz)   01/24/25 1442 104.1 kg (229 lb 6.4 oz) " "  10/21/24 0940 105.2 kg (231 lb 14.8 oz)        Estimated body mass index is 41.36 kg/m² as calculated from the following:    Height as of this encounter: 5' 4" (1.626 m).    Weight as of this encounter: 109.3 kg (240 lb 15.4 oz).  Ideal body weight: 54.7 kg (120 lb 9.5 oz)     Hemoglobin A1C   Date Value Ref Range Status   10/15/2024 5.1 4.0 - 5.6 % Final     Comment:     ADA Screening Guidelines:  5.7-6.4%  Consistent with prediabetes  >or=6.5%  Consistent with diabetes    High levels of fetal hemoglobin interfere with the HbA1C  assay. Heterozygous hemoglobin variants (HbS, HgC, etc)do  not significantly interfere with this assay.   However, presence of multiple variants may affect accuracy.         ROS:  General: -fever, -chills, -fatigue, +weight gain, -weight loss, +excessive drowsiness  Eyes: -vision changes, +redness, -discharge, +dry eyes  ENT: -ear pain, -nasal congestion, -sore throat  Cardiovascular: -chest pain, -palpitations, -lower extremity edema  Respiratory: -cough, -shortness of breath, +snoring  Gastrointestinal: -abdominal pain, -nausea, -vomiting, -diarrhea, -constipation, -blood in stool  Genitourinary: -dysuria, -hematuria, -frequency  Musculoskeletal: -joint pain, -muscle pain  Skin: -rash, -lesion  Neurological: -headache, -dizziness, -numbness, -tingling  Psychiatric: -anxiety, -depression, -sleep difficulty             PAST MEDICAL HISTORY:  Past Medical History:   Diagnosis Date    Hypothyroidism     PCOS (polycystic ovarian syndrome)        PAST SURGICAL HISTORY:  Past Surgical History:   Procedure Laterality Date    THYROIDECTOMY, TOTAL, WITH RADICAL NECK DISSECTION Bilateral        SOCIAL HISTORY:  Social History[1]    FAMILY HISTORY:  Family History   Problem Relation Name Age of Onset    Diabetes Mother Magda Walters     Hypertension Mother Magda Walters     No Known Problems Father      No Known Problems Sister      Diabetes Maternal Grandmother Funmilayo Bowling     Alcohol " "abuse Maternal Grandfather Ab Bowling     Liver disease Maternal Grandfather Ab Bowling     Hypertension Paternal Grandmother Eileen Estrellado     Cancer Paternal Grandmother Eileen Tonydo     Lung cancer Paternal Grandfather Martin Walters     Alcohol abuse Maternal Uncle Ab Bowling Jr     Breast cancer Neg Hx      Ovarian cancer Neg Hx      Colon cancer Neg Hx         ALLERGIES AND MEDICATIONS: updated and reviewed.  Review of patient's allergies indicates:   Allergen Reactions    Crab Hives    Shrimp Hives     Medication List with Changes/Refills   New Medications    METFORMIN (GLUCOPHAGE-XR) 500 MG ER 24HR TABLET    Take 1 tablet (500 mg total) by mouth daily with breakfast.   Current Medications    LEVOTHYROXINE (SYNTHROID) 112 MCG TABLET    Take 1 tablet (112 mcg total) by mouth daily before breakfast.          CARE TEAM:  Patient Care Team:  Carlos Dallas MD as PCP - General (Internal Medicine)         PHYSICAL EXAM:   Vitals:    04/15/25 0928   BP: 108/82   Pulse: 88   Temp: 99.4 °F (37.4 °C)     Weight: 109.3 kg (240 lb 15.4 oz)   Height: 5' 4" (162.6 cm)   Body mass index is 41.36 kg/m².    Physical Exam    Vitals: Weight: 240 lbs, 15.4 ounces.  General: No acute distress. Well-developed. Well-nourished.  Eyes: EOMI. Sclerae anicteric.  HENT: Normocephalic. Atraumatic. Nares patent. Moist oral mucosa.  Ears: Bilateral TMs clear. Bilateral EACs clear.  Cardiovascular: Regular rate. Regular rhythm. No murmurs. No rubs. No gallops. Normal S1, S2.  Respiratory: Normal respiratory effort. Clear to auscultation bilaterally. No rales. No rhonchi. No wheezing.  Abdomen: Soft. Non-tender. Non-distended. Normoactive bowel sounds.  Musculoskeletal: No  obvious deformity.  Extremities: No lower extremity edema.  Neurological: Alert & oriented x3. No slurred speech. Normal gait.  Psychiatric: Normal mood. Normal affect. Good insight. Good judgment.  Skin: Warm. Dry. No rash.     "         ASSESSMENT AND PLAN:  IMPRESSION:  - Assessed dry eye symptoms and contact lens use, recommending switch to daily disposable lenses and use of compatible eye drops (e.g., OptiOne, Refresh) for potential relief.  - Evaluated weight gain concerns, noting recent thyroidectomy and current levothyroxine treatment.  - Consider sleep apnea evaluation due to reported snoring.    Class 3 severe obesity due to excess calories with serious comorbidity and body mass index (BMI) of 40.0 to 44.9 in adult  PCOS (polycystic ovarian syndrome)  Postsurgical hypothyroidism  Snoring  -     Ambulatory referral/consult to Sleep Disorders; Future; Expected date: 04/22/2025  -     Ambulatory Referral/Consult to Lifestyle Nutrition; Future; Expected date: 04/22/2025  -     metFORMIN (GLUCOPHAGE-XR) 500 MG ER 24hr tablet; Take 1 tablet (500 mg total) by mouth daily with breakfast.  Dispense: 90 tablet; Refill: 3  -     Comprehensive Metabolic Panel; Future; Expected date: 04/15/2025  -     Hemoglobin A1C; Future; Expected date: 04/15/2025  - Initiated Metformin extended release 500 mg daily with breakfast for PCOS management.  - Assessed the patient's condition and determined the need to restart Metformin for PCOS and weight management.  - Instructed the patient to take Metformin 500 mg daily with breakfast.  - Noted that the patient underwent a thyroidectomy in March of last year.  - Reviewed the patient's last thyroid function test, which was normal.  - Continued the current prescription of levothyroxine 112 mcg daily.  - Initiated Metformin extended release 500 mg daily with breakfast for modest weight loss effect.  - Noted that the patient's weight has increased from 231 lbs 14.8 ounces in October to 240 lbs 15.4 ounces today, with the current weight of 109.3 kilograms being the patient's highest adult weight.  - Assessed the patient's willingness to change diet and exercise habits.  - Recommend lifestyle changes including a  Mediterranean style meal plan, increased physical activity, and weight resistance training.  - Referred the patient to [facility] location for a nutrition consultation.  - Suggested the patient consider joining the [facility] for coaching.  - Recommend Mediterranean style meal plan.  - Dennise to engage in moderate intensity exercise for at least 30 minutes 5 times per week or 1 hour 3 times per week, aiming for 150 minutes of aerobic activity weekly.  - Dennise to implement weight training program at home using dumbbells at least twice weekly.  - Dennise to track daily steps.  - Noted that the patient reports recent onset of snoring as observed by friends.  - Suspected possible sleep apnea due to recent weight gain and snoring.  - Referred the patient for a sleep apnea evaluation.    Routine eye exam  -     Ambulatory referral/consult to Optometry; Future; Expected date: 04/22/2025    Dry eyes  - Noted that the patient reports dry eyes when using contacts or glasses, with redness at the end of work shifts.  - Recommend switching to daily disposable contact lenses and using eye drops compatible with contact lenses.  - Provided a handout on contact lens care.  - Referred the patient to Optometry for a comprehensive eye exam.  - Referred the patient to Optometry for a contact lens evaluation.  - Recommend switching to daily disposable contact lenses for better comfort.    - Scheduled a follow up in 3 months to reassess progress with weight loss efforts and medication management.  - Ordered CMP and A1C in 3-4 months, non-fasting.     This note was generated with the assistance of ambient listening technology. Verbal consent was obtained by the patient and accompanying visitor(s) for the recording of patient appointment to facilitate this note. I attest to having reviewed and edited the generated note for accuracy, though some syntax or spelling errors may persist. Please  contact the author of this note for any clarification.             [1]   Social History  Socioeconomic History    Marital status: Single   Occupational History    Occupation: nurse [employer]   Tobacco Use    Smoking status: Never    Smokeless tobacco: Never   Substance and Sexual Activity    Alcohol use: Not Currently     Comment: one beer per month    Drug use: Never    Sexual activity: Never     Social Drivers of Health     Financial Resource Strain: Low Risk  (4/14/2025)    Overall Financial Resource Strain (CARDIA)     Difficulty of Paying Living Expenses: Not hard at all   Food Insecurity: No Food Insecurity (4/14/2025)    Hunger Vital Sign     Worried About Running Out of Food in the Last Year: Never true     Ran Out of Food in the Last Year: Never true   Transportation Needs: No Transportation Needs (4/14/2025)    PRAPARE - Transportation     Lack of Transportation (Medical): No     Lack of Transportation (Non-Medical): No   Physical Activity: Insufficiently Active (4/14/2025)    Exercise Vital Sign     Days of Exercise per Week: 1 day     Minutes of Exercise per Session: 50 min   Stress: No Stress Concern Present (4/14/2025)    [employer] [place] [employer] - Occupational Stress Questionnaire     Feeling of Stress : Not at all   Housing Stability: Low Risk  (4/14/2025)    Housing Stability Vital Sign     Unable to Pay for Housing in the Last Year: No     Number of Times Moved in the Last Year: 1     Homeless in the Last Year: No

## 2025-04-16 ENCOUNTER — PATIENT MESSAGE (OUTPATIENT)
Dept: OPHTHALMOLOGY | Facility: CLINIC | Age: 35
End: 2025-04-16
Payer: COMMERCIAL

## 2025-04-29 ENCOUNTER — OFFICE VISIT (OUTPATIENT)
Dept: SLEEP MEDICINE | Facility: CLINIC | Age: 35
End: 2025-04-29
Payer: COMMERCIAL

## 2025-04-29 DIAGNOSIS — R06.83 SNORING: ICD-10-CM

## 2025-04-29 DIAGNOSIS — G47.30 SLEEP-DISORDERED BREATHING: Primary | ICD-10-CM

## 2025-04-29 PROCEDURE — 98002 SYNCH AUDIO-VIDEO NEW MOD 45: CPT | Mod: 95,,, | Performed by: NURSE PRACTITIONER

## 2025-04-29 PROCEDURE — 1159F MED LIST DOCD IN RCRD: CPT | Mod: CPTII,95,, | Performed by: NURSE PRACTITIONER

## 2025-04-29 PROCEDURE — 1160F RVW MEDS BY RX/DR IN RCRD: CPT | Mod: CPTII,95,, | Performed by: NURSE PRACTITIONER

## 2025-04-29 NOTE — PROGRESS NOTES
"The patient location is: [place]  The chief complaint leading to consultation is: trouble with sleep    Visit type: audiovisual    30 minutes of total time spent on the encounter, which includes face to face time and non-face to face time preparing to see the patient (eg, review of tests), Obtaining and/or reviewing separately obtained history, Documenting clinical information in the electronic or other health record, Independently interpreting results (not separately reported) and communicating results to the patient/family/caregiver, or Care coordination (not separately reported).     Each patient to whom he or she provides medical services by telemedicine is:  (1) informed of the relationship between the physician and patient and the respective role of any other health care provider with respect to management of the patient; and (2) notified that he or she may decline to receive medical services by telemedicine and may withdraw from such care at any time.    Referred by Carlos Dallas MD     NEW PATIENT VISIT    Dennise Walters  is a pleasant 34 y.o. female who presents in the Spring of 2025 for sleep evaluation.    See assessment below for further history.    Past Medical History:   Diagnosis Date    Hypothyroidism     PCOS (polycystic ovarian syndrome)    Problem List[1]Current Medications[2]    There were no vitals filed for this visit.  Physical Exam:    GEN:   Well-appearing  Psych:  Appropriate affect, demonstrates insight  SKIN:  No rash on the face or bridge of the nose      LABS:   No results found for: "CO2"      No echocardiogram results found for the past 12 months     No results found for: "FERRITIN"    RECORDS REVIEWED:      ASSESSMENT    Sig PMH:  PROBLEM DESCRIPTION/ Sx on Presentation  STATUS PLAN     Sx ANGÉLICA   Presentation:     Friends told her that she was snoring when she shared a hotel room on vacation. Reported snoring and recent weight gain to PCP; referred for " sleep study to rule out ANGÉLICA.    Mother and sister snore.    yes - snoring  no - gasping arousals/coughing  no - witnessed apneas, pauses in breathing    resolved - night sweats    no - AM headaches    no - dry mouth/sore throat      new   -we discussed sleep testing to evaluate for ANGÉLICA  -discussed possible treatments for ANGÉLICA including CPAP therapy       Daytime symptoms       Usually feels refreshed when waking up in the morning.    [place] Sleepiness Scale:  Sitting and readin  Watching TV:                              0  Passenger in a car x 1 hr:          2  Sitting quietly after lunch:           0  Lying down to rest in the afternoon when circumstances permit:           1  Sitting, inactive in public:            0  Sitting+ talking to someone:        0  Stopped in traffic:                        0  Total                                            3/24  ESS 3/24 on intake          new   -will reassess sleepiness after evaluation for ANGÉLICA     Insomnia       no - difficulty with sleep onset    no - difficulty with sleep maintenance      SLEEP SCHEDULE   Duration    Wind- down    Envmnt    CBTi    Meds prior    Meds now    Bed Time 130AM   Lights out    Latency quickly   Arousals 0-1   Back to sleep N/a   Avg TST 6-7 hrs   Wake time 8AM for medication, then sleeps until 915AM   Caffeine    Naps Off days x 30min-2hrs, refreshing   Nocturia 0-1   Work [employer] RN                 new   -will reassess after evaluation for sleep-disordered breathing       Nocturia     x 0-1 per sleep period    new          RTC:  will arrange RTC depending on results of sleep testing         PLAN      -recommend sleep testing   -HST ordered  -discussed trial therapy if ANGÉLICA present and the patient is open to a trial of CPAP therapy  -discussed the etiology of obstructive sleep apnea as well as the potential ramifications of untreated sleep apnea, which could include daytime sleepiness, hypertension, heart disease  and/or stroke. We discussed potential treatment options, which could include weight loss, body positioning, continuous positive airway pressure (CPAP), oral appliance, Inspire, or referral for surgical consideration.        Advised on plan of care. Answered all patient questions. Patient verbalized understanding and voiced agreement with plan of care.                           [1]   Patient Active Problem List  Diagnosis    Postsurgical hypothyroidism    Class 2 obesity without serious comorbidity with body mass index (BMI) of 39.0 to 39.9 in adult   [2]   Current Outpatient Medications:     levothyroxine (SYNTHROID) 112 MCG tablet, Take 1 tablet (112 mcg total) by mouth daily before breakfast., Disp: 30 tablet, Rfl: 11    metFORMIN (GLUCOPHAGE-XR) 500 MG ER 24hr tablet, Take 1 tablet (500 mg total) by mouth daily with breakfast., Disp: 90 tablet, Rfl: 3

## 2025-04-30 ENCOUNTER — TELEPHONE (OUTPATIENT)
Dept: EMERGENCY MEDICINE | Facility: HOSPITAL | Age: 35
End: 2025-04-30
Payer: COMMERCIAL

## 2025-04-30 RX ORDER — AZELASTINE 1 MG/ML
1 SPRAY, METERED NASAL 2 TIMES DAILY
Qty: 30 ML | Refills: 0 | Status: SHIPPED | OUTPATIENT
Start: 2025-04-30 | End: 2026-04-30

## 2025-04-30 RX ORDER — AMOXICILLIN 500 MG/1
1000 CAPSULE ORAL DAILY
Qty: 20 CAPSULE | Refills: 0 | Status: SHIPPED | OUTPATIENT
Start: 2025-04-30 | End: 2025-05-11

## 2025-04-30 RX ORDER — CETIRIZINE HYDROCHLORIDE 10 MG/1
10 TABLET ORAL DAILY
Qty: 14 TABLET | Refills: 0 | Status: SHIPPED | OUTPATIENT
Start: 2025-04-30 | End: 2025-05-15

## 2025-04-30 RX ORDER — METHYLPREDNISOLONE 4 MG/1
TABLET ORAL
Qty: 21 EACH | Refills: 0 | Status: SHIPPED | OUTPATIENT
Start: 2025-04-30 | End: 2025-05-21

## 2025-05-26 ENCOUNTER — TELEPHONE (OUTPATIENT)
Dept: SLEEP MEDICINE | Facility: HOSPITAL | Age: 35
End: 2025-05-26
Payer: COMMERCIAL

## 2025-06-12 ENCOUNTER — OFFICE VISIT (OUTPATIENT)
Dept: OPTOMETRY | Facility: CLINIC | Age: 35
End: 2025-06-12
Payer: COMMERCIAL

## 2025-06-12 DIAGNOSIS — Z97.3 WEARS CONTACT LENSES: ICD-10-CM

## 2025-06-12 DIAGNOSIS — Z46.0 FITTING AND ADJUSTMENT OF SPECTACLES AND CONTACT LENSES: Primary | ICD-10-CM

## 2025-06-12 DIAGNOSIS — H52.13 MYOPIA OF BOTH EYES: Primary | ICD-10-CM

## 2025-06-12 PROCEDURE — 99999 PR PBB SHADOW E&M-EST. PATIENT-LVL III: CPT | Mod: PBBFAC,,, | Performed by: OPTOMETRIST

## 2025-06-12 PROCEDURE — 1159F MED LIST DOCD IN RCRD: CPT | Mod: CPTII,S$GLB,, | Performed by: OPTOMETRIST

## 2025-06-12 PROCEDURE — 99499 UNLISTED E&M SERVICE: CPT | Mod: S$GLB,,, | Performed by: OPTOMETRIST

## 2025-06-12 PROCEDURE — 92004 COMPRE OPH EXAM NEW PT 1/>: CPT | Mod: S$GLB,,, | Performed by: OPTOMETRIST

## 2025-06-12 NOTE — PROGRESS NOTES
HPI    Pt is here today for routine eye exam. Denies pain/discomfort. Experiences   redness and fatigue after long days.   DLS: March 2024  (-)Flashes   (-)Floaters   (-)Diplopia   (-)Headaches   (-)Itching   (-)Tearing  (-)Burning  (+)Dryness   (-)Photophobia  (-)Glare   (-)Blurred VA  Past Eye Sx: (-)  Eye Meds: (-)   Last edited by Netta Daniels, OD on 6/12/2025  2:23 PM.            Assessment /Plan     For exam results, see Encounter Report.    Myopia of both eyes  -     Ambulatory referral/consult to Optometry    Wears contact lenses      1.   Eyeglass Final Rx       Eyeglass Final Rx         Sphere Cylinder Axis    Right -0.75 +0.50 030    Left -0.75 +0.50 030      Type: SVL *Eyezen 0/Start    Expiration Date: 6/12/2026                   2. Updated CL Rx. Initially good comfort and vision. Given CL trials to take home. If happy with comfort and vision of trial lenses, may order annual supply. If issues arise, RTC for CL f/u. Reviewed proper CL care and hygiene. Monitor yearly unless changes noted sooner.      Contact Lens Current Rx       Current Contact Lens Rx (Trial Lens, Dispensed)         Brand Base Curve Diameter Sphere Cylinder Dist VA Centration Movement    Right Biofinity Energys 8.6 14.2 -0.50 Sphere 20/20 Well-centered Adequate    Left Biofinity Energys 8.6 14.2 -0.50 Sphere 20/20 Well-centered Adequate                   RTC in 1 year for annual eye exam unless changes noted sooner.

## 2025-06-19 ENCOUNTER — PATIENT MESSAGE (OUTPATIENT)
Dept: OPTOMETRY | Facility: CLINIC | Age: 35
End: 2025-06-19
Payer: COMMERCIAL

## 2025-06-24 ENCOUNTER — OFFICE VISIT (OUTPATIENT)
Dept: ENDOCRINOLOGY | Facility: CLINIC | Age: 35
End: 2025-06-24
Payer: COMMERCIAL

## 2025-06-24 VITALS
SYSTOLIC BLOOD PRESSURE: 122 MMHG | HEART RATE: 86 BPM | WEIGHT: 239.38 LBS | DIASTOLIC BLOOD PRESSURE: 89 MMHG | BODY MASS INDEX: 41.09 KG/M2

## 2025-06-24 DIAGNOSIS — E66.812 CLASS 2 OBESITY DUE TO EXCESS CALORIES WITHOUT SERIOUS COMORBIDITY WITH BODY MASS INDEX (BMI) OF 39.0 TO 39.9 IN ADULT: ICD-10-CM

## 2025-06-24 DIAGNOSIS — G47.33 OSA (OBSTRUCTIVE SLEEP APNEA): ICD-10-CM

## 2025-06-24 DIAGNOSIS — E89.0 POSTSURGICAL HYPOTHYROIDISM: Primary | ICD-10-CM

## 2025-06-24 DIAGNOSIS — E28.2 PCOS (POLYCYSTIC OVARIAN SYNDROME): ICD-10-CM

## 2025-06-24 DIAGNOSIS — E66.09 CLASS 2 OBESITY DUE TO EXCESS CALORIES WITHOUT SERIOUS COMORBIDITY WITH BODY MASS INDEX (BMI) OF 39.0 TO 39.9 IN ADULT: ICD-10-CM

## 2025-06-24 PROCEDURE — 1160F RVW MEDS BY RX/DR IN RCRD: CPT | Mod: CPTII,S$GLB,, | Performed by: HOSPITALIST

## 2025-06-24 PROCEDURE — 1159F MED LIST DOCD IN RCRD: CPT | Mod: CPTII,S$GLB,, | Performed by: HOSPITALIST

## 2025-06-24 PROCEDURE — G2211 COMPLEX E/M VISIT ADD ON: HCPCS | Mod: S$GLB,,, | Performed by: HOSPITALIST

## 2025-06-24 PROCEDURE — 3074F SYST BP LT 130 MM HG: CPT | Mod: CPTII,S$GLB,, | Performed by: HOSPITALIST

## 2025-06-24 PROCEDURE — 3079F DIAST BP 80-89 MM HG: CPT | Mod: CPTII,S$GLB,, | Performed by: HOSPITALIST

## 2025-06-24 PROCEDURE — 99214 OFFICE O/P EST MOD 30 MIN: CPT | Mod: S$GLB,,, | Performed by: HOSPITALIST

## 2025-06-24 PROCEDURE — 3008F BODY MASS INDEX DOCD: CPT | Mod: CPTII,S$GLB,, | Performed by: HOSPITALIST

## 2025-06-24 PROCEDURE — 99999 PR PBB SHADOW E&M-EST. PATIENT-LVL III: CPT | Mod: PBBFAC,,, | Performed by: HOSPITALIST

## 2025-06-24 NOTE — ASSESSMENT & PLAN NOTE
- Hypothyroidism due to postsurgical hypothyroidism, previously had goiter/multiple thyroid nodules and hyperthyroidism  - I reviewed lab work trends: TSH, Free T4, with patient in clinic today 6/24/2025   - Discussed and confirmed the proper way of taking levothyroxine: daily on an empty stomach, waiting 30 minutes before any other medication. The patient verbalized understanding.  - CONTINUE:  Levothyroxine 112 mcg daily.  Check lab work in a few days, pending results will adjust medication more  - TSH goal should be under 3.0  - Trend lab work TSH, FT4 every 6 months> follow-up with endocrinology to adjust medication  - If TSH/T4 in normal range, patient should continue refills with their PCP Carlos Dallas MD, given chronic nature of hypothyroidism

## 2025-06-24 NOTE — ASSESSMENT & PLAN NOTE
- Body mass index is 41.09 kg/m².  - Encourage pt to work on healthy diet, increase exercise as tolerated.  - Continue to monitor weight   - Consider [facility] weight loss program>> for Zepbound/Wegovy, cost may be an issue  - agree with ANGÉLICA evaluation, as this can lead to approval of Zepbound

## 2025-06-24 NOTE — ASSESSMENT & PLAN NOTE
- Polycystic Ovary Syndrome- Diagnosis based on clinical and biochemical evidence of hyperandrogenism.  - Long duration and lack of serious progression is reassuring.    - Currently has:  NO PLANS FOR UPCOMING PREGNANCY 6/24/2025    Plan:  - Alerted that PCOS is a risk for T2DM.  Agree with the use of metformin  - Lifestyle changes and weight loss strategies emphasized  - may benefit from use of GIP/GLP1: Zepbound with diagnosis of ANGÉLICA when applicable  - I did discuss OCP: Patient currently not interested  - advised patient to follow up with GYN

## 2025-06-24 NOTE — PROGRESS NOTES
Subjective:      Patient ID: Dennise Walters is a 34 y.o. female presented to [facility] today.  Chief complaint:  Hypothyroidism      History of Present illness: Dennise Walters is a 34 y.o. female here for  postsurgical hypothyroidism  Other significant past medical history:  Obesity, PCOS  Current PCP Carlos Dallas MD  Work in the [facility]      INTERVAL HISTORY: Patient is here for follow-up for hypothyroidism   She does work day shifts now, but due to busy work schedule does miss levothyroxine dose On occasion   Did miss a few days this week   Current in clinic weight:  At 239 lb, previous in clinic weight: 229 lb   Getting ANGÉLICA evaluation, to see if she qualifies for CPAP as well as medication: Zepbound  Needs to set up ANGÉLICA study      1) Postsurgical Hypothyroidism  - Diagnosed  in: Total thyroidectomy 3/25/2024 in setting of hyperthyroidism and goiter/multiple thyroid nodules in the [facility].  - Originally from the [facility].  Patient had a total thyroidectomy due to hyperthyroidism and multiple thyroid nodules in the [facility] on 3/25/2024. [place]oved [place] the [place] after that.  Having issue adjusting her levothyroxine dose.    - Medical record review, will scan to our records.  had hyperthyroidism elevated FT4 and FT3 with low TSH in 2/5/2024, symptoms of tachycardia: was on Carbimazole and propanolol. Had Lugol and SSKI drops before surgery given elevated FT4, FT3 at before total thyroidectomy. She was in a [place] to go [place] the [place].   - Pathology records review:  Pathology:  Multiple colloid goiter, with chronic lymphocytic thyroiditis  - Family history thyroid disorder: no, not that she know off   - Hx of Thyroid goiter: yes  - Hx of Autoimmune issue: no, ?mom with arthritis?  - Hx of gluten intolerance/lactose intolerance: yes? Lactose intolerance?  - Tobacco use: no  - Plan for pregnancy at this time: no  - Chronic  "tachycardia noted    - CURRENT REGIMEN: Generic levothyroxine 112 mcg daily, was on Euthyrox>> swap to generic  - Takes thyroid medication properly without food first thing in the morning, no, due to busy work schedule does miss dose on occasion    Current symptoms:   No   Yes  []    [x]   Weight gain  []    [x]   Fatigue  [x]    []   Constipation  []    [x]   Hair loss  [x]    []   Brittle nails  [x]    []   Mental fog  []    [x]   Cold intolerance  [x]    []   Recent severe illness  Medications:  [x]    []   Lithium Use  [x]    []   Amiodarone use  [x]    []   NSAIDs/systemic steroid  []    []   Chemotherapy   []    []   Radiation Treatment  []    []   Estrogen therapy  []    []   Supplements:  That includes:  High dose iodine: Including "thyroid support", Kelp, Iodine drops, Sea iqbal, Bladder wrack  [x]    []   Other Supplements: Biotin, Ashwagandha, selenium    Thyroid lab work  Lab Results   Component Value Date    TSH 3.143 02/14/2025    TSH 11.009 (H) 01/06/2025    TSH 4.475 (H) 10/15/2024    FREET4 1.08 02/14/2025    FREET4 1.08 01/06/2025    FREET4 1.00 10/15/2024    W7LTDEY 67 02/14/2025      Antibodies  Lab Results   Component Value Date    THYROPEROXID <6.0 02/14/2025    THYGLBTUM <0.1 02/14/2025    THGABSCRN 5.5 (H) 02/14/2025       2) PCOS  - Irregular menstrual cycle> for many yeas, PCOS>> was given metformin. See GYN 2024.  - Was on OCP 2016, did regulate cycle  - last cycle 6/2025  - she is attempting to change her diet to help with weight loss   - She is taking metformin      3) Obesity  - Current in clinic weight:  At 239 lb, previous in clinic weight: 229 lb, Body mass index is 41.09 kg/m².  - Getting ANGÉLICA evaluation, to see if she qualifies for CPAP as well as medication: Zepbound  - Needs to set up ANGÉLICA study    Weight trend  Wt Readings from Last 3 Encounters:   06/24/25 0938 108.6 kg (239 lb 6.4 oz)   04/15/25 0928 109.3 kg (240 lb 15.4 oz)   01/24/25 1442 104.1 kg (229 lb 6.4 oz)      The " patient's medications, allergies, past medical, surgical, social and family histories were reviewed and updated as appropriate.  Review of Systems: pertinent positives as per the above HPI, and otherwise negative.    Objective:   /89   Pulse 86   Wt 108.6 kg (239 lb 6.4 oz)   BMI 41.09 kg/m²   Body mass index is 41.09 kg/m².  Vital signs reviewed    Physical Exam  Vitals and nursing note reviewed.   Constitutional:       Appearance: Normal appearance. She is well-developed. She is obese. She is not ill-appearing.   Neck:      Thyroid: No thyromegaly.   Pulmonary:      Effort: Pulmonary effort is normal. No respiratory distress.   Musculoskeletal:         General: Normal range of motion.      Cervical back: Normal range of motion.   Neurological:      General: No focal deficit present.      Mental Status: She is alert. Mental status is at baseline.   Psychiatric:         Mood and Affect: Mood normal.         Behavior: Behavior normal.       Labs reviewed:  See results in subjective  Lab Results   Component Value Date    HGBA1C 5.1 10/15/2024     Lab Results   Component Value Date    CHOL 188 10/15/2024    HDL 56 10/15/2024    LDLCALC 113.2 10/15/2024    TRIG 94 10/15/2024    CHOLHDL 29.8 10/15/2024     Lab Results   Component Value Date     10/15/2024    K 4.1 10/15/2024     10/15/2024    CO2 27 10/15/2024    GLU 86 10/15/2024    BUN 8 10/15/2024    CREATININE 0.8 10/15/2024    CALCIUM 9.3 10/15/2024    PROT 7.4 10/15/2024    ALBUMIN 4.1 10/15/2024    BILITOT 0.6 10/15/2024    ALKPHOS 54 (L) 10/15/2024    AST 14 10/15/2024    ALT 11 10/15/2024    ANIONGAP 9 10/15/2024    EGFRNORACEVR >60 10/15/2024    TSH 3.143 02/14/2025     Assessment     1. Postsurgical hypothyroidism  TSH    T4, Free    TSH    T4, Free      2. Class 2 obesity due to excess calories without serious comorbidity with body mass index (BMI) of 39.0 to 39.9 in adult        3. ANGÉLICA (obstructive sleep apnea)        4. PCOS (polycystic  ovarian syndrome)             Plan     Postsurgical hypothyroidism  - Hypothyroidism due to postsurgical hypothyroidism, previously had goiter/multiple thyroid nodules and hyperthyroidism  - I reviewed lab work trends: TSH, Free T4, with patient in clinic today 6/24/2025   - Discussed and confirmed the proper way of taking levothyroxine: daily on an empty stomach, waiting 30 minutes before any other medication. The patient verbalized understanding.  - CONTINUE:  Levothyroxine 112 mcg daily.  Check lab work in a few days, pending results will adjust medication more  - TSH goal should be under 3.0  - Trend lab work TSH, FT4 every 6 months> follow-up with endocrinology to adjust medication  - If TSH/T4 in normal range, patient should continue refills with their PCP Carlos Dallas MD, given chronic nature of hypothyroidism      Class 2 obesity without serious comorbidity with body mass index (BMI) of 39.0 to 39.9 in adult  - Body mass index is 41.09 kg/m².  - Encourage pt to work on healthy diet, increase exercise as tolerated.  - Continue to monitor weight   - [facility]>> for Zepbound/Wegovy, cost may be an issue  - agree with ANGÉLICA evaluation, as this can lead to approval of Zepbound    PCOS (polycystic ovarian syndrome)  - Polycystic Ovary Syndrome- Diagnosis based on clinical and biochemical evidence of hyperandrogenism.  - Long duration and lack of serious progression is reassuring.    - Currently has:  NO PLANS FOR UPCOMING PREGNANCY 6/24/2025    Plan:  - Alerted that PCOS is a risk for T2DM.  Agree with the use of metformin  - Lifestyle changes and weight loss strategies emphasized  - may benefit from use of GIP/GLP1: Zepbound with diagnosis of ANGÉLICA when applicable  - I did discuss OCP: Patient currently not interested  - advised patient to follow up with GYN       Advised patient to follow up with PCP for routine health maintenance care.   RTC in 5-6 months    Visit  today included increased complexity associated with the care of the episodic problem addressed and managing the longitudinal care of the patient due to the serious and/or complex managed problem(s).   Including:  Hypothyroidism, obesity,    Paul Lawrence M.D.  Endocrinology  [facility]  6/24/2025      Disclaimer: This note has been generated in part with the use of voice-recognition software. There may be typographical errors that have been missed during proof-reading.

## 2025-06-25 ENCOUNTER — LAB VISIT (OUTPATIENT)
Dept: LAB | Facility: HOSPITAL | Age: 35
End: 2025-06-25
Attending: HOSPITALIST
Payer: COMMERCIAL

## 2025-06-25 DIAGNOSIS — E89.0 POSTSURGICAL HYPOTHYROIDISM: ICD-10-CM

## 2025-06-25 LAB
T4 FREE SERPL-MCNC: 1.06 NG/DL (ref 0.71–1.51)
T4 FREE SERPL-MCNC: 1.06 NG/DL (ref 0.71–1.51)
TSH SERPL-ACNC: 3.73 UIU/ML (ref 0.4–4)

## 2025-06-25 PROCEDURE — 36415 COLL VENOUS BLD VENIPUNCTURE: CPT

## 2025-06-25 PROCEDURE — 84439 ASSAY OF FREE THYROXINE: CPT

## 2025-06-25 PROCEDURE — 84443 ASSAY THYROID STIM HORMONE: CPT

## 2025-07-16 ENCOUNTER — TELEPHONE (OUTPATIENT)
Dept: ENDOCRINOLOGY | Facility: CLINIC | Age: 35
End: 2025-07-16
Payer: COMMERCIAL

## 2025-07-16 DIAGNOSIS — E89.0 POSTSURGICAL HYPOTHYROIDISM: ICD-10-CM

## 2025-07-16 RX ORDER — LEVOTHYROXINE SODIUM 125 UG/1
125 TABLET ORAL
Qty: 90 TABLET | Refills: 3 | Status: SHIPPED | OUTPATIENT
Start: 2025-07-16

## 2025-07-17 NOTE — TELEPHONE ENCOUNTER
Lab work review show TSH at 3.7, free T4 at 1.06  Reproductive age range should be under 3, will increase dose

## 2025-08-12 ENCOUNTER — LAB VISIT (OUTPATIENT)
Dept: LAB | Facility: HOSPITAL | Age: 35
End: 2025-08-12
Attending: INTERNAL MEDICINE
Payer: COMMERCIAL

## 2025-08-12 DIAGNOSIS — E28.2 PCOS (POLYCYSTIC OVARIAN SYNDROME): ICD-10-CM

## 2025-08-12 DIAGNOSIS — E66.813 CLASS 3 SEVERE OBESITY DUE TO EXCESS CALORIES WITH SERIOUS COMORBIDITY AND BODY MASS INDEX (BMI) OF 40.0 TO 44.9 IN ADULT: ICD-10-CM

## 2025-08-12 LAB
ALBUMIN SERPL BCP-MCNC: 4.4 G/DL (ref 3.5–5.2)
ALP SERPL-CCNC: 46 UNIT/L (ref 40–150)
ALT SERPL W/O P-5'-P-CCNC: 16 UNIT/L (ref 10–44)
ANION GAP (OHS): 11 MMOL/L (ref 8–16)
AST SERPL-CCNC: 23 UNIT/L (ref 11–45)
BILIRUB SERPL-MCNC: 0.4 MG/DL (ref 0.1–1)
BUN SERPL-MCNC: 10 MG/DL (ref 6–20)
CALCIUM SERPL-MCNC: 9.4 MG/DL (ref 8.7–10.5)
CHLORIDE SERPL-SCNC: 104 MMOL/L (ref 95–110)
CO2 SERPL-SCNC: 27 MMOL/L (ref 23–29)
CREAT SERPL-MCNC: 0.8 MG/DL (ref 0.5–1.4)
GFR SERPLBLD CREATININE-BSD FMLA CKD-EPI: >60 ML/MIN/1.73/M2
GLUCOSE SERPL-MCNC: 93 MG/DL (ref 70–110)
POTASSIUM SERPL-SCNC: 3.7 MMOL/L (ref 3.5–5.1)
PROT SERPL-MCNC: 7.8 GM/DL (ref 6–8.4)
SODIUM SERPL-SCNC: 142 MMOL/L (ref 136–145)

## 2025-08-12 PROCEDURE — 83036 HEMOGLOBIN GLYCOSYLATED A1C: CPT

## 2025-08-12 PROCEDURE — 82040 ASSAY OF SERUM ALBUMIN: CPT

## 2025-08-12 PROCEDURE — 36415 COLL VENOUS BLD VENIPUNCTURE: CPT

## 2025-08-13 LAB
EAG (OHS): 105 MG/DL (ref 68–131)
HBA1C MFR BLD: 5.3 % (ref 4–5.6)

## 2025-08-19 ENCOUNTER — OFFICE VISIT (OUTPATIENT)
Dept: FAMILY MEDICINE | Facility: CLINIC | Age: 35
End: 2025-08-19
Payer: COMMERCIAL

## 2025-08-19 VITALS
DIASTOLIC BLOOD PRESSURE: 84 MMHG | WEIGHT: 245.13 LBS | OXYGEN SATURATION: 97 % | BODY MASS INDEX: 41.85 KG/M2 | HEIGHT: 64 IN | TEMPERATURE: 97 F | SYSTOLIC BLOOD PRESSURE: 122 MMHG | HEART RATE: 100 BPM

## 2025-08-19 DIAGNOSIS — E66.09 CLASS 2 OBESITY DUE TO EXCESS CALORIES WITHOUT SERIOUS COMORBIDITY WITH BODY MASS INDEX (BMI) OF 39.0 TO 39.9 IN ADULT: ICD-10-CM

## 2025-08-19 DIAGNOSIS — Z00.00 ANNUAL PHYSICAL EXAM: Primary | ICD-10-CM

## 2025-08-19 DIAGNOSIS — E89.0 POSTSURGICAL HYPOTHYROIDISM: ICD-10-CM

## 2025-08-19 DIAGNOSIS — E28.2 PCOS (POLYCYSTIC OVARIAN SYNDROME): ICD-10-CM

## 2025-08-19 DIAGNOSIS — Z00.00 HEALTHCARE MAINTENANCE: ICD-10-CM

## 2025-08-19 DIAGNOSIS — E66.812 CLASS 2 OBESITY DUE TO EXCESS CALORIES WITHOUT SERIOUS COMORBIDITY WITH BODY MASS INDEX (BMI) OF 39.0 TO 39.9 IN ADULT: ICD-10-CM

## 2025-08-19 PROCEDURE — 1160F RVW MEDS BY RX/DR IN RCRD: CPT | Mod: CPTII,S$GLB,, | Performed by: INTERNAL MEDICINE

## 2025-08-19 PROCEDURE — 3008F BODY MASS INDEX DOCD: CPT | Mod: CPTII,S$GLB,, | Performed by: INTERNAL MEDICINE

## 2025-08-19 PROCEDURE — 3044F HG A1C LEVEL LT 7.0%: CPT | Mod: CPTII,S$GLB,, | Performed by: INTERNAL MEDICINE

## 2025-08-19 PROCEDURE — 99999 PR PBB SHADOW E&M-EST. PATIENT-LVL V: CPT | Mod: PBBFAC,,, | Performed by: INTERNAL MEDICINE

## 2025-08-19 PROCEDURE — 3074F SYST BP LT 130 MM HG: CPT | Mod: CPTII,S$GLB,, | Performed by: INTERNAL MEDICINE

## 2025-08-19 PROCEDURE — 99395 PREV VISIT EST AGE 18-39: CPT | Mod: S$GLB,,, | Performed by: INTERNAL MEDICINE

## 2025-08-19 PROCEDURE — 1159F MED LIST DOCD IN RCRD: CPT | Mod: CPTII,S$GLB,, | Performed by: INTERNAL MEDICINE

## 2025-08-19 PROCEDURE — 3079F DIAST BP 80-89 MM HG: CPT | Mod: CPTII,S$GLB,, | Performed by: INTERNAL MEDICINE

## 2025-08-20 ENCOUNTER — HOSPITAL ENCOUNTER (OUTPATIENT)
Dept: SLEEP MEDICINE | Facility: HOSPITAL | Age: 35
Discharge: HOME | End: 2025-08-20
Attending: NURSE PRACTITIONER
Payer: COMMERCIAL

## 2025-08-20 DIAGNOSIS — R06.83 SNORING: ICD-10-CM

## 2025-08-20 DIAGNOSIS — G47.30 SLEEP-DISORDERED BREATHING: ICD-10-CM

## 2025-08-20 PROCEDURE — 95800 SLP STDY UNATTENDED: CPT

## 2025-08-21 PROBLEM — R06.83 SNORING: Status: ACTIVE | Noted: 2025-08-21

## 2025-08-22 PROCEDURE — 95800 SLP STDY UNATTENDED: CPT | Mod: 26,,, | Performed by: INTERNAL MEDICINE

## 2025-08-25 DIAGNOSIS — G47.33 OBSTRUCTIVE SLEEP APNEA: Primary | ICD-10-CM
